# Patient Record
Sex: MALE | Race: WHITE | URBAN - METROPOLITAN AREA
[De-identification: names, ages, dates, MRNs, and addresses within clinical notes are randomized per-mention and may not be internally consistent; named-entity substitution may affect disease eponyms.]

---

## 2017-08-01 ENCOUNTER — OFFICE VISIT (OUTPATIENT)
Dept: URGENT CARE | Facility: CLINIC | Age: 22
End: 2017-08-01

## 2017-08-01 VITALS
DIASTOLIC BLOOD PRESSURE: 62 MMHG | SYSTOLIC BLOOD PRESSURE: 100 MMHG | WEIGHT: 161 LBS | HEART RATE: 78 BPM | RESPIRATION RATE: 16 BRPM | OXYGEN SATURATION: 97 % | TEMPERATURE: 98 F

## 2017-08-01 DIAGNOSIS — J02.9 SORE THROAT: ICD-10-CM

## 2017-08-01 DIAGNOSIS — J03.90 EXUDATIVE TONSILLITIS: ICD-10-CM

## 2017-08-01 DIAGNOSIS — J36 PERITONSILLAR CELLULITIS: ICD-10-CM

## 2017-08-01 LAB
INT CON NEG: NORMAL
INT CON POS: NORMAL
S PYO AG THROAT QL: NEGATIVE

## 2017-08-01 PROCEDURE — 99203 OFFICE O/P NEW LOW 30 MIN: CPT | Performed by: FAMILY MEDICINE

## 2017-08-01 PROCEDURE — 87880 STREP A ASSAY W/OPTIC: CPT | Performed by: FAMILY MEDICINE

## 2017-08-01 RX ORDER — MELOXICAM 15 MG/1
15 TABLET ORAL DAILY
Qty: 7 TAB | Refills: 1 | Status: SHIPPED | OUTPATIENT
Start: 2017-08-01 | End: 2017-08-08

## 2017-08-01 RX ORDER — CEFDINIR 300 MG/1
300 CAPSULE ORAL EVERY 12 HOURS
Qty: 14 CAP | Refills: 0 | Status: SHIPPED | OUTPATIENT
Start: 2017-08-01 | End: 2017-08-08

## 2017-08-01 ASSESSMENT — ENCOUNTER SYMPTOMS
HEMOPTYSIS: 0
SHORTNESS OF BREATH: 0
FEVER: 0
FOCAL WEAKNESS: 0
CHILLS: 0
ORTHOPNEA: 0
DIZZINESS: 0

## 2017-08-01 NOTE — MR AVS SNAPSHOT
Christian Watters   2017 4:30 PM   Office Visit   MRN: [id number]    Department:  [facility]   Dept Phone:  [phone]    Description:  Male : 1995   Provider:  Nathaniel Stoll M.D.           Reason for Visit     Pharyngitis Today sorethroat      Allergies as of 2017     Allergen Noted Reactions    Codeine 2011       Medi-Tussin Max Strength 10/24/2007   Hives    With codeine    Nkda [No Known Drug Allergy] 10/24/2007         You were diagnosed with     Sore throat   [144518]       Exudative tonsillitis   [9967505]       Peritonsillar cellulitis   [[id number]]         Vital Signs     Blood Pressure Pulse Temperature Respirations Weight Oxygen Saturation    100/62 mmHg 78 36.7 °C (98 °F) 16 73.029 kg (161 lb) 97%    Smoking Status                   Never Smoker            Basic Information     Date Of Birth Sex Race Ethnicity Preferred Language    1995 Male White Unknown English      Health Maintenance        Date Due Completion Dates    IMM HEP B VACCINE (1 of 3 - Primary Series) 1997 ---    IMM HEP A VACCINE (1 of 2 - Standard Series) 1998 ---    IMM HPV VACCINE (1 of 3 - Male 3 Dose Series) 2008 ---    IMM VARICELLA (CHICKENPOX) VACCINE (1 of 2 - 2 Dose Adolescent Series) 2010 ---    IMM MENINGOCOCCAL VACCINE (MCV4) (1 of 1) 2013 ---    IMM DTaP/Tdap/Td Vaccine (1 - Tdap) 2016 ---    IMM INFLUENZA (1) 2017 ---            Current Immunizations     No immunizations on file.      Below and/or attached are the medications your provider expects you to take. Review all of your home medications and newly ordered medications with your provider and/or pharmacist. Follow medication instructions as directed by your provider and/or pharmacist. Please keep your medication list with you and share with your provider. Update the information when medications are discontinued, doses are changed, or new medications (including over-the-counter products) are  added; and carry medication information at all times in the event of emergency situations     Allergies:  CODEINE - (reactions not documented)     MEDI-TUSSIN MAX STRENGTH - Hives     NKDA - (reactions not documented)               Medications  Valid as of: August 01, 2017 -  5:04 PM    Generic Name Brand Name Tablet Size Instructions for use    Cefdinir (Cap) OMNICEF 300 MG Take 1 Cap by mouth every 12 hours for 7 days.        Meloxicam (Tab) MOBIC 15 MG Take 1 Tab by mouth every day for 7 days.        .                 Medicines prescribed today were sent to:     [id number] - [place], [place] - [address]    [address] [place] [address]    Phone: [phone] Fax: [phone]    Open 24 Hours?: No      Medication refill instructions:       If your prescription bottle indicates you have medication refills left, it is not necessary to call your provider’s office. Please contact your pharmacy and they will refill your medication.    If your prescription bottle indicates you do not have any refills left, you may request refills at any time through one of the following ways: The online Tabblo system (except Urgent Care), by calling your provider’s office, or by asking your pharmacy to contact your provider’s office with a refill request. Medication refills are processed only during regular business hours and may not be available until the next business day. Your provider may request additional information or to have a follow-up visit with you prior to refilling your medication.   *Please Note: Medication refills are assigned a new Rx number when refilled electronically. Your pharmacy may indicate that no refills were authorized even though a new prescription for the same medication is available at the pharmacy. Please request the medicine by name with the pharmacy before contacting your provider for a refill.           Tabblo Access Code: [email or web address]  Expires: 8/31/2017  5:04 PM    Your email  address is not on file at [facility].  Email Addresses are required for you to sign up for [employer], please contact [phone] to verify your personal information and to provide your email address prior to attempting to register for [employer] [address]    [email or web address]  A secure, online tool to manage your health information     [facility]’s [employer]® is a secure, online tool that connects you to your personalized health information from the privacy of your home -- day or night - making it very easy for you to manage your healthcare. Once the activation process is completed, you can even access your medical information using the [employer] simba, which is available for free in the Apple Simba store or Google Play store.     To learn more about [employer], visit [email or web address]    There are two levels of access available (as shown below):   My Chart Features  [facility] Primary Care Doctor [facility]  Specialists [facility]  Urgent  Care Non-[facility] Primary Care Doctor   Email your healthcare team securely and privately 24/7 X X X    Manage appointments: schedule your next appointment; view details of past/upcoming appointments X      Request prescription refills. X      View recent personal medical records, including lab and immunizations X X X X   View health record, including health history, allergies, medications X X X X   Read reports about your outpatient visits, procedures, consult and ER notes X X X X   See your discharge summary, which is a recap of your hospital and/or ER visit that includes your diagnosis, lab results, and care plan X X  X     How to register for [employer]:  Once your e-mail address has been verified, follow the following steps to sign up for [employer].     1. Go to  [email or web address]  2. Click on the Sign Up Now box, which takes you to the New Member Sign Up page. You will need to provide the following information:  a. Enter your [employer] Access Code  exactly as it appears at the top of this page. (You will not need to use this code after you’ve completed the sign-up process. If you do not sign up before the expiration date, you must request a new code.)   b. Enter your date of birth.   c. Enter your home email address.   d. Click Submit, and follow the next screen’s instructions.  3. Create a [employer] ID. This will be your [employer] login ID and cannot be changed, so think of one that is secure and easy to remember.  4. Create a [employer] password. You can change your password at any time.  5. Enter your Password Reset Question and Answer. This can be used at a later time if you forget your password.   6. Enter your e-mail address. This allows you to receive e-mail notifications when new information is available in [employer].  7. Click Sign Up. You can now view your health information.    For assistance activating your [employer] account, call [phone]

## 2017-08-01 NOTE — PROGRESS NOTES
Subjective:      Christian Watters is a 22 y.o. male who presents with Pharyngitis    Chief Complaint   Patient presents with   • Pharyngitis     Today sorethroat        - This is a very pleasant 22 y.o. male with complaints of ST/fever x 1 day, no cough/runny nose.           ALLERGIES:  Codeine; Medi-tussin max strength; and Nkda     PMH:  No past medical history on file.     MEDS:  No current outpatient prescriptions on file.    ** I have documented what I find to be significant in regards to past medical, social, family and surgical history  in my HPI or under PMH/PSH/FH review section, otherwise it is contributory **             Pharyngitis   Pertinent negatives include no shortness of breath.       Review of Systems   Constitutional: Negative for fever and chills.   Respiratory: Negative for hemoptysis and shortness of breath.    Cardiovascular: Negative for chest pain and orthopnea.   Neurological: Negative for dizziness and focal weakness.          Objective:     /62 mmHg  Pulse 78  Temp(Src) 36.7 °C (98 °F)  Resp 16  Wt 73.029 kg (161 lb)  SpO2 97%     Physical Exam   Constitutional: He appears well-developed. No distress.   HENT:   Head: Normocephalic and atraumatic.   Mouth/Throat: Oropharyngeal exudate present.   Eyes: Conjunctivae are normal.   Neck: Neck supple.   Cardiovascular: Regular rhythm.    No murmur heard.  Pulmonary/Chest: Effort normal and breath sounds normal. No respiratory distress.   Lymphadenopathy:     He has cervical adenopathy.   Neurological: He is alert. He exhibits normal muscle tone.   Skin: Skin is warm and dry.   Psychiatric: He has a normal mood and affect. Judgment normal.   Nursing note and vitals reviewed.  just Lt sided tonsil is injected Grade III w/ exudate and some erythema extending around it to soft palate. Uvula midline, no trismus             Assessment/Plan:         1. Sore throat     2. Exudative tonsillitis     3. Peritonsillar cellulitis                Dx & d/c instructions discussed w/ patient and/or family members. Follow up w/ Prvt Dr or here in 3-4 days if not getting better, sooner if needed,  ER if worse and UC/PCP unavailable.        Possible side effects (i.e. Rash, GI upset/constipation, sedation, elevation of BP or sugars) of any medications given discussed.